# Patient Record
(demographics unavailable — no encounter records)

---
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
## 2024-10-23 NOTE — ASSESSMENT
[FreeTextEntry1] : 66F with urge predominant incontinence that has improved with consistent use of Trospium.  Microscopic Hematuria - neg w/u as of 11/15/23 - CTU images reviewed, no  pathology - s/p cysto --> neg  Glucosuria + Proteinuria - HGB A1C was 5.4  Urinary Urge + UUI - Pt to discuss w/ PCP the feasibility of taking diuretic in AM; if this doesn't drop her nighttime voided volume of <33% of daily volume, would refer to sleep study to r/o MARTIN - Voiding diary reviewed; nocturia may be 2/2 to diuretic taken at night + some degree of MARTIN - Continue to avoid alcohol and caffeinated beverages - Treat constipation; goal is to have daily soft BMs; more aggressive bowel regimen indicated in light of narcotic use - Reinforced importance of pelvic floor strengthening - Continue Trospium 20mg daily - refill sent  Vaginal dryness/atrophy - Recommend consistent use of Premarin cream as per GYN  - RTC in 6 months, sooner if issues arise   Carlos Pascal MD Chief of Urology, [address], [facility] [address]tr[address] [place] [address] Phone: [phone] Fax: [phone]

## 2024-10-23 NOTE — ASSESSMENT
[FreeTextEntry1] : 66F with urge predominant incontinence that has improved with consistent use of Trospium.  Microscopic Hematuria - neg w/u as of 11/15/23 - CTU images reviewed, no  pathology - s/p cysto --> neg  Glucosuria + Proteinuria - HGB A1C was 5.4  Urinary Urge + UUI - Pt to discuss w/ PCP the feasibility of taking diuretic in AM; if this doesn't drop her nighttime voided volume of <33% of daily volume, would refer to sleep study to r/o MARTIN - Voiding diary reviewed; nocturia may be 2/2 to diuretic taken at night + some degree of MARTIN - Continue to avoid alcohol and caffeinated beverages - Treat constipation; goal is to have daily soft BMs; more aggressive bowel regimen indicated in light of narcotic use - Reinforced importance of pelvic floor strengthening - Continue Trospium 20mg daily - refill sent  Vaginal dryness/atrophy - Recommend consistent use of Premarin cream as per GYN  - RTC in 6 months, sooner if issues arise   Carlos Pascal MD Chief of Urology, [address], [facility] [address]tr[address] [place] [address] Phone: [phone] Fax: [phone]

## 2024-10-23 NOTE — ASSESSMENT
[FreeTextEntry1] : 66F with urge predominant incontinence that has improved with consistent use of Trospium.  Microscopic Hematuria - neg w/u as of 11/15/23 - CTU images reviewed, no  pathology - s/p cysto --> neg  Glucosuria + Proteinuria - HGB A1C was 5.4  Urinary Urge + UUI - Pt to discuss w/ PCP the feasibility of taking diuretic in AM; if this doesn't drop her nighttime voided volume of <33% of daily volume, would refer to sleep study to r/o MARTIN - Voiding diary reviewed; nocturia may be 2/2 to diuretic taken at night + some degree of MARTIN - Continue to avoid alcohol and caffeinated beverages - Treat constipation; goal is to have daily soft BMs; more aggressive bowel regimen indicated in light of narcotic use - Reinforced importance of pelvic floor strengthening - Continue Trospium 20mg daily - refill sent  Vaginal dryness/atrophy - Recommend consistent use of Premarin cream as per GYN  - RTC in 6 months, sooner if issues arise   Carlos Pascal MD Chief of Urology, [address], [facility] [address]tr[address] [place] [address] Phone: [phone] Fax: [phone]

## 2024-10-30 NOTE — PHYSICAL EXAM
[None] : none [Cooperative] : cooperative [Depressed] : depressed [Clear] : clear [Linear/Goal Directed] : linear/goal directed [Average] : average [WNL] : within normal limits [FreeTextEntry1] : overweight [FreeTextEntry8] : better [de-identified] : dysphoric. better

## 2024-10-30 NOTE — PLAN
[FreeTextEntry4] : Assessment: Patient is a 64 yo female with h/o depression and anxiety seen today for medication management. Patient is compliant with the medications, tolerating it well without any side effects. I-STOP was checked without any problems.  I-STOP: Patient Demographic Information (PDI)     PDI	First Name	Last Name	Birth Date	Gender	Street Address	[facility]	Zip Code [place]	11/27/1957	Female	[address] [facility]	[id number]  Prescription Information    PDI Filter:   PDI	My Rx	Current Rx	Drug Type	Rx Written	Rx Dispensed	Drug	Quantity	Days Supply	Prescriber Name	Prescriber NICKI #	Payment Method	Dispenser A	N	Y	S	10/08/2023	10/11/2023	dextroamp-amphet er 20 mg cap	14	14	Marley Carmona	[id number]	Medicare	[id number] A	N	Y	O	10/07/2023	10/11/2023	oxycodone-acetaminophen 7.5-325 mg tablet	90	30	Edvin Dinero)	[id number]	Medicare	[facility][id number] A	N	N	O	09/05/2023	09/08/2023	oxycodone-acetaminophen 7.5-325 mg tablet	90	30	Edvin Dinero)	[id number]	Medicare	[id number] A	N	N	O	07/28/2023	08/01/2023	oxycodone-acetaminophen 7.5-325 mg tablet	90	30	Edvin Dinero)	[id number]	Medicare	[id number] A	N	N	O	06/15/2023	06/25/2023	oxycodone hcl (ir) 10 mg tab	90	30	Edvin Dinero)	[id number]	Medicare	[facility][id number] A	N	N	O	05/11/2023	05/22/2023	oxycodone hcl (ir) 10 mg tab	90	30	Edvin Dinero)	[id number]	Medicare	[id number] A	N	N	B	03/17/2023	04/08/2023	clonazepam 1 mg tablet	54	28	Derik Vilchis MD	[id number]	Medicare	[id number] A	N	N	O	04/03/2023	04/08/2023	oxycodone hcl (ir) 10 mg tab	90	30	Edvin Dinero)	[id number]	Medicare	[id number] A	N	N	O	02/28/2023	03/06/2023	oxycodone hcl (ir) 10 mg tab	90	30	Edvin Dinero (MD)	[id number]	Medicare	[id number] A	N	N	B	02/13/2023	03/05/2023	clonazepam 1 mg tablet	54	27	Derik Vilchis MD	[id number]	Medicare	[id number] A	N	N	S	02/13/2023	02/25/2023	dextroamp-amphetamin 20 mg tab	30	30	SodaroDerik MD	[id number]	Medicare	[id number] A	N	N	O	01/27/2023	02/03/2023	oxycodone hcl (ir) 10 mg tab	90	30	Edvin Dinero)	[id number]	Medicare	[id number] A	N	N	B	01/13/2023	01/30/2023	clonazepam 1 mg tablet	56	28	SodDerik mera MD	[id number]	Medicare	[id number] A	N	N	S	01/13/2023	01/24/2023	dextroamp-amphetamin 20 mg tab	30	30	SodDerik mera MD	[id number]	Medicare	[id number] A	N	N	O	12/23/2022	01/03/2023	oxycodone hcl (ir) 10 mg tab	90	30	Edvin Dinero)	[id number]	Medicare	[facility][id number] A	N	N	S	12/13/2022	12/28/2022	dextroamp-amphetamin 20 mg tab	15	30	Derik Vilchis MD	[id number]	[facility][id number] A	N	N	B	12/13/2022	12/28/2022	clonazepam 1 mg tablet	58	30	Derik Vilchis MD	[id number]	[facility] #[id number] A	N	N	O	11/10/2022	12/01/2022	oxycodone hcl (ir) 10 mg tab	90	30	Edvin Dinero)	[id number]	[facility] #[id number] A	N	N	S	11/15/2022	11/28/2022	dextroamp-amphetamin 20 mg tab	15	30	Derik Vilchis MD	[id number]	Insurance	[id number] A	N	N	B	11/15/2022	11/28/2022	clonazepam 1 mg tablet	60	30	Derik Vilchis MD	[id number]	Insurance	[facility] #[id number]   PLAN: On Oxycodone.  Continue Cymbalta 120 mg PO QAM for depression and anxiety. Continue Wellbutrin  mg PO QAM for depression, low motivation, energy, concentration and decrease SSRI induced sexual dysfunction. Continue Adderall 25 mg PO QD for ADHD.   Continue Trazodone 50 mg PO QHS for insomnia. Gave two 50 mg  Wean off Pristiq D/C Klonopin and Xanax. Has old scripts.  - Discussed risks and benefits of medications including side effects of GI and sexual with SSRI. Alternative strategies including no intervention discussed with patient. Patient consents to current medications as prescribed. - Discussed with patient regarding importance of abstinence and sobriety from alcohol and drugs. Educated about relationship between worsening mood/anxiety symptoms and drug use and improvement of symptoms with abstinence.  - Discussed about unpredictable effects including cardiorespiratory collapse from the combination of illicit drugs and prescribed medications. Patient verbalized understanding. - Patient understands to contact clinic prn with concerns and agrees to call 911 or go to nearest ER if symptoms worsen. - Next appointment made in 3 month. Patient was not in any distress.
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
## 2024-11-01 NOTE — HISTORY OF PRESENT ILLNESS
[No Pertinent Cardiac History] : no history of aortic stenosis, atrial fibrillation, coronary artery disease, recent myocardial infarction, or implantable device/pacemaker [Asthma] : asthma [COPD] : COPD [No Adverse Anesthesia Reaction] : no adverse anesthesia reaction in self or family member [(Patient denies any chest pain, claudication, dyspnea on exertion, orthopnea, palpitations or syncope)] : Patient denies any chest pain, claudication, dyspnea on exertion, orthopnea, palpitations or syncope [Moderate (4-6 METs)] : Moderate (4-6 METs) [Chronic Anticoagulation] : no chronic anticoagulation [Chronic Kidney Disease] : no chronic kidney disease [Diabetes] : no diabetes [FreeTextEntry1] : Knee replacement [FreeTextEntry2] : 11/7/2024 [FreeTextEntry3] : Dr. Washburn  [FreeTextEntry4] : 67yo female with history of mild intermittent asthma, hyperlipidemia, hypothyroid, depression, hypertension, osteopenia presents for medical clearance for right knee replacement.

## 2024-11-01 NOTE — ASSESSMENT
[High Risk Surgery - Intraperitoneal, Intrathoracic or Supringuinal Vascular Procedures] : High Risk Surgery - Intraperitoneal, Intrathoracic or Supringuinal Vascular Procedures - No (0) [Ischemic Heart Disease] : Ischemic Heart Disease - No (0) [Congestive Heart Failure] : Congestive Heart Failure - No (0) [Prior Cerebrovascular Accident or TIA] : Prior Cerebrovascular Accident or TIA - No (0) [Creatinine >= 2mg/dL (1 Point)] : Creatinine >= 2mg/dL - No (0) [Insulin-dependent Diabetic (1 Point)] : Insulin-dependent Diabetic - No (0) [0] : 0 , RCRI Class: I, Risk of Post-Op Cardiac Complications: 3.9%, 95% CI for Risk Estimate: 2.8% - 5.4% [Patient Optimized for Surgery] : Patient optimized for surgery [No Further Testing Recommended] : no further testing recommended [As per surgery] : as per surgery [FreeTextEntry4] : 67yo female with history of mild intermittent asthma, hyperlipidemia, hypothyroid, depression, hypertension, osteopenia presents for medical clearance for right knee replacement. Instructions for duoneb use reviewed, symbicort.

## 2024-11-25 NOTE — ASSESSMENT
[FreeTextEntry1] : The patient comes in today for follow-up on her right knee.  She is now 2 weeks out from her right total knee replacement for posttraumatic arthritis.  Overall she is doing beautifully.  Her wounds are healed.  There is no signs of infection or DVT.  Range of motion 0 to 100 degrees with normal tracking, no crepitation.  Examination of the right knee reveals well-healed scar with no signs of infection or DVT.  Her staples removed uneventfully and Steri-Strips were applied.  Range of motion 0 to 100 degrees with normal tracking no crepitation.  There is no instability.  Plan at this time is physical therapy.  Will see her back in the office in 2 or 3 weeks with x-rays 4 views of the right knee.

## 2024-11-25 NOTE — HISTORY OF PRESENT ILLNESS
[FreeTextEntry5] : 65 y/o F presents for PO #1 eval today. Pt reports of continued pain with recovery.

## 2024-12-08 NOTE — HISTORY OF PRESENT ILLNESS
[] : yes [FreeTextEntry5] : 68 y/o F presents for PO #2 eval today. Pt reports of increased pain since last visit.

## 2024-12-08 NOTE — ASSESSMENT
[FreeTextEntry1] : The patient is 4 weeks s/p a right knee TKA on 11/7/24. The patient denies fever, chills, CP, SOB. The patient denies drainage or discharge from their incision. The patient is doing well postoperatively. The patient is using oxycodone every 4 hours or so with good relief. She is using a walker and will begin outpatient PT. She is making good functional progress. She denies paresthesias. She denies new trauma.   Right knee exam: .Neurovascularly intact. Sensation intact. Examination of the right knee reveals well-healed scar with no signs of infection or DVT. Steri strips removed with two small blisters to the proximal incision. No active drainage or discharge. Range of motion 0 to 110 degrees with normal tracking no crepitation.  There is no instability.  Right knee xrays taken today in office, 4 views NWB - Revision right TKA hardware intact without movement or loosening. No fractures or loose bodies. No obvious tumors, masses, or lesions.  The patient is doing well overall. She will begin outpatient PT and use oxycodone 10mg as needed. She will return in 8 weeks time. She will wean off of walker as comfortable.

## 2024-12-08 NOTE — HISTORY OF PRESENT ILLNESS
[] : yes [FreeTextEntry5] : 66 y/o F presents for PO #2 eval today. Pt reports of increased pain since last visit.

## 2025-01-31 NOTE — HISTORY OF PRESENT ILLNESS
[] : yes [FreeTextEntry5] : 68 y/o F presents for PO #3 eval today. Pain has improved since last visit. Reports of soreness at times, especially after PT. PT 2x a week. WB with cane.

## 2025-01-31 NOTE — ASSESSMENT
[FreeTextEntry1] : The patient is 11 weeks s/p a right knee TKA on 11/7/24.  The patient is doing well postoperatively. The patient has significantly decreased her oxycodone and is taking one in the morning and one at night. She denies new trauma. She denies paresthesias. She is making good functional progress. She is continuing with PT and is doing well.  Right knee exam: Neurovascularly intact. Sensation intact. Examination of the right knee reveals well-healed scar with no signs of infection or DVT. No active drainage or discharge. Range of motion 0 to 115 degrees with normal tracking no crepitation.  There is no instability.  The plan at this time is to continue physical therapy.  I will see her back in the office in 3 months as needed.

## 2025-02-05 NOTE — PROCEDURE
[FreeTextEntry1] : 67y/o female never smoker BMI 28.72  PFT 1/4/2024 Poor efforts noted on expiratory limb FVC FEV1 and FEV1/FVC are normal FEV1.FVC is normal corrected for age FEF 25-75% is normal MVV is normal  Lung volumes by body plethysmography reveal high TLC and RV/TLC  SILVANO) is reduced and DLCo/Va is normal  Impression Likely normal Spirometry. Airtrapping. recommend clincial correlation  Signatures     Electronically signed by : CITLALY ESCAMILLA MD; Jan 5 2024  2:33PM Eastern Standard Time (Author)  12/2023 ACC: [id number] EXAM: CT CHEST ORDERED BY: CITLALY ESCAMILLA  PROCEDURE DATE: 12/01/2023 INTERPRETATION: Clinical information: Cough. Exam is compared to previous study of 10/13/2023. CT scan of the chest was obtained without administration of intravenous contrast. No hilar or mediastinal adenopathy is noted. Heart is normal in size. No pericardial effusion is noted. Calcification of the coronary arteries noted. Moderate to large hiatal hernia is noted. No endobronchial lesions are noted. Mild bronchiectasis is noted in the right middle lobe and lingula. Few ill-defined opacities are noted in the right middle lobe and lingula. A few tree-in-bud opacities are scattered within both lungs. Previously noted patchy groundglass opacities in the left upper lobe are no longer present. No pleural effusions are noted.  Below the diaphragm, visualized portions of the abdomen are unremarkable.  Degenerative changes of the spine are noted.  IMPRESSION: Previously noted patchy groundglass opacities in the left upper lobe are no longer present and have resolved.  Mild bronchiectasis in the right middle lobe/lingula, few ill-defined opacities in the right middle lobe and lingula as well as tree-in-bud opacities are scattered within both lungs. Constellation of the above findings suggest probable Mycobacterium avium complex infection. --- End of Report --  ANASTASIA CERDA MD; Attending Radiologist This document has been electronically signed. Dec 12 2023 8:44AMmmigdalia gutierrez

## 2025-02-05 NOTE — CURRENT MEDS
[Takes medication as prescribed] : does not take [FreeTextEntry1] : does not like to take  her medications as discussed -

## 2025-02-05 NOTE — PHYSICAL EXAM
[IV] : Mallampati Class: IV [Normal Appearance] : normal appearance [Supple] : supple [No Neck Mass] : no neck mass [No JVD] : no jvd [Normal Rate/Rhythm] : normal rate/rhythm [Normal S1, S2] : normal s1, s2 [No Murmurs] : no murmurs [No Resp Distress] : no resp distress [No Acc Muscle Use] : no acc muscle use [Kyphosis] : kyphosis [Benign] : benign [Not Tender] : not tender [No Hernias] : no hernias [Normal Gait] : normal gait [No Clubbing] : no clubbing [No Edema] : no edema [No Rash] : no rash [No Motor Deficits] : no motor deficits [Normal Affect] : normal affect [TextBox_2] : pleasant f no distress     no cough  [TextBox_11] : crowded airway no lesion moist [TextBox_68] : bilateral   wheezing

## 2025-02-05 NOTE — REVIEW OF SYSTEMS
[Dry Eyes] : dry eyes [Sore Throat] : sore throat [Dry Mouth] : dry mouth [Back Pain] : back pain [Chronic Pain] : chronic pain [Thyroid Problem] : thyroid problem [Obesity] : obesity [Cough] : cough [Fever] : no fever [Recent Wt Gain (___ Lbs)] : ~T no recent weight gain [Chills] : no chills [Recent Wt Loss (___ Lbs)] : ~T no recent weight loss [Nasal Congestion] : no nasal congestion [Sinus Problems] : no sinus problems [Hemoptysis] : no hemoptysis [Sputum] : no sputum [Dyspnea] : no dyspnea [Wheezing] : no wheezing [SOB on Exertion] : no sob on exertion [Chest Discomfort] : no chest discomfort [Palpitations] : no palpitations [GERD] : no gerd [Abdominal Pain] : no abdominal pain [Nausea] : no nausea [Vomiting] : no vomiting [Dysphagia] : no dysphagia [Bleeding] : no bleeding [Rash] : no rash [Blood Transfusion] : no blood transfusion [Clotting Disorder/ Frequent bleeding] : no clotting disorder/ frequent bleeding [Diabetes] : no diabetes [TextBox_14] : lasik  [TextBox_94] : knee   fracture    oct   2024  surgery

## 2025-02-05 NOTE — ASSESSMENT
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  6- vaccinations   Recommendations 1-  on prilsec  and  famotidine  2- trial of trelegy   100 qd rinse after use 3-  prednisone   taper   4- sputum  culture   afb    blood work   RVP  5-    ct f/u three months 6-  zpak  7- states she does not make sputum  --   told to try and explained  process  - discussion on ct and  asthma  - risk of progression of disease - risk of cardiac and  resp complications reviewed - discussed to take her medications and follow up  as  discussed - this visit    required several phone calls and reminders      - she does not  do  above as recommended - to the ED   if worse

## 2025-02-05 NOTE — HISTORY OF PRESENT ILLNESS
[Former] : former [< 20 pack-years] : < 20 pack-years [Never] : never [TextBox_4] : 11/7/2023 64y/o    female born in [place]  ex smoker ( only 4 year quti 1981)  retired    RN   h/o  ADHA     has dog  HTN  h/o right leg tibial  practure and  repair  levon - then -  restless leg syndrome   hypothyroid     here for abn ct - h/o hiatal hernia  8 years   GI  Dr Blood -   endoscopy   multiple    ---  -covid   --   over one year ago  -    Nov2022   mild   cough fever  aches joint  no hospital  -  seen by rheum in past  for pain     bilateral   hip replacement   - negative    work up    2 yeears  -  had cold ---       when ct chest done  ---    and wheezing      chest congestion  -  bronchial cough     - gerd  omeprazole  20 mg po qd now ( was on 40 mg po qd)   -  CT reviewed with patient in detail    was sick  when   done  --  10/2023 AIRWAYS/LUNGS/PLEURA: Central airways are patent. Secretions in the distal segmental branches (best visualized in the right upper lobe read). Bilateral groundglass opacities, which are most prominent in the left upper lobe. Bilateral, peripheral and lower lobe predominant tree-in-bud nodules. Linear opacity in the medial right middle lobe and lingula, likely representing atelectasis. 4 mm nodule along the minor fissure (2-88). 3 mm nodule in the left upper lobe (2-215). No pleural effusion.  UPPER ABDOMEN: Hiatal hernia is unchanged.  BONES/SOFT TISSUES: T12 hemangioma. Degenerative changes.  IMPRESSION:  1. Bilateral peripheral lower lobe predominant tree-in-bud opacities. Additional bilateral groundglass opacities, which are most prominent in the left upper lobe. This may be secondary to infectious process such as atypical mycobacteria. However, other etiologies are not excluded. 2. Bilateral pulmonary nodules measuring up to 4 mm.    --- End of Report ---     AMEYA WILDER MD; Resident Radiologist This document has been electronically signed. CHAYA CERDA MD; Attending Radiologist This document has been electronically signed. 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  asthma   [facility]  abn ct   pre-operative    respiratory  exam  total knee replacement  right after  fracture  -asthma   stable no cough  no need symbicort   prn only  -rheum  n egative   work up  - will see  heme - seen by GI Dr Jones  upper GI -ectopic gastric mucosa upper 1/3 esophagus - abn esophageal motility bx -3 cm hiatal hernia - gastric polyp resected - gastric body- erythematous mucosa bx  -will get esophageal manometry-    did get  some   motility  issue  lax in movement -- only PPI  for   medications   - had knee left fracture -     walking  with brace    2/5/2025    lost to follow up  66y/o female ex smoker   retired RN  asthma   bronchiectasis   seen by Dr Jones   hiatal hernia  GERD abn motility    f/u  ct chest - no recent  infection  - cough intermittent  weeks   - no symbicort use now - feels  cough improved now   mild now - she does not feel  she has asthma nor thinks  she needs inhalers - reviewed ct in detail and   recent uri with  cough now  clear mucous   - s/p shoulder surgery   1/2024 - right knee surgery   11/2024 - ct reviewed - currently  feels good -  [TextBox_13] : 4 [YearQuit] : 1981

## 2025-03-19 NOTE — PHYSICAL EXAM
[None] : none [Cooperative] : cooperative [Depressed] : depressed [Clear] : clear [Linear/Goal Directed] : linear/goal directed [Average] : average [WNL] : within normal limits [FreeTextEntry1] : overweight [FreeTextEntry8] : better [de-identified] : dysphoric. better

## 2025-03-19 NOTE — PLAN
[FreeTextEntry4] : Assessment: Patient is a 66 yo female with h/o depression and anxiety seen today for medication management. Patient is compliant with the medications, tolerating it well without any side effects. I-STOP was checked without any problems.  I-STOP: Patient Demographic Information (PDI)     PDI	First Name	Last Name	Birth Date	Gender	Street Address	[facility]	Zip Code [place]	11/27/1957	Female	[address] at the [facility]	[id number]  Prescription Information    PDI Filter:   PDI	My Rx	Current Rx	Drug Type	Rx Written	Rx Dispensed	Drug	Quantity	Days Supply	Prescriber Name	Prescriber NICKI #	Payment Method	Dispenser A	N	Y	S	10/08/2023	10/11/2023	dextroamp-amphet er 20 mg cap	14	14	Marley Carmona	[id number]	Medicare	[id number] A	N	Y	O	10/07/2023	10/11/2023	oxycodone-acetaminophen 7.5-325 mg tablet	90	30	Edvin Dinero)	[id number]	Medicare	[id number] A	N	N	O	09/05/2023	09/08/2023	oxycodone-acetaminophen 7.5-325 mg tablet	90	30	Edvin Dinero)	[id number]	Medicare	[id number] A	N	N	O	07/28/2023	08/01/2023	oxycodone-acetaminophen 7.5-325 mg tablet	90	30	Edvin Dinero)	[id number]	Medicare	[id number] A	N	N	O	06/15/2023	06/25/2023	oxycodone hcl (ir) 10 mg tab	90	30	Edvin Dinero)	[id number]	Medicare	[id number] A	N	N	O	05/11/2023	05/22/2023	oxycodone hcl (ir) 10 mg tab	90	30	Edvin Dinero)	[id number]	Medicare	[id number] A	N	N	B	03/17/2023	04/08/2023	clonazepam 1 mg tablet	54	28	Derik Vilchis MD	[id number]	Medicare	[id number] A	N	N	O	04/03/2023	04/08/2023	oxycodone hcl (ir) 10 mg tab	90	30	Edvin Dinero)	[id number]	Medicare	[id number] A	N	N	O	02/28/2023	03/06/2023	oxycodone hcl (ir) 10 mg tab	90	30	Edvin Dinero (MD)	[id number]	Medicare	[id number] A	N	N	B	02/13/2023	03/05/2023	clonazepam 1 mg tablet	54	27	Derik Vilchis MD	[id number]	Medicare	[id number] A	N	N	S	02/13/2023	02/25/2023	dextroamp-amphetamin 20 mg tab	30	30	SodaroDerik MD	[id number]	Medicare	[id number] A	N	N	O	01/27/2023	02/03/2023	oxycodone hcl (ir) 10 mg tab	90	30	Edvin Dinero)	[id number]	Medicare	[id number] A	N	N	B	01/13/2023	01/30/2023	clonazepam 1 mg tablet	56	28	SodDerik mera MD	[id number]	Medicare	[id number] A	N	N	S	01/13/2023	01/24/2023	dextroamp-amphetamin 20 mg tab	30	30	SodDerik mera MD	[id number]	Medicare	[id number] A	N	N	O	12/23/2022	01/03/2023	oxycodone hcl (ir) 10 mg tab	90	30	Edvin Dinero)	[id number]	Medicare	[id number] A	N	N	S	12/13/2022	12/28/2022	dextroamp-amphetamin 20 mg tab	15	30	Derik Vilchis MD	[id number]	[id number] A	N	N	B	12/13/2022	12/28/2022	clonazepam 1 mg tablet	58	30	Derik Vilchis MD	[id number]	[id number] A	N	N	O	11/10/2022	12/01/2022	oxycodone hcl (ir) 10 mg tab	90	30	Edvin Dinero)	[id number]	[id number] A	N	N	S	11/15/2022	11/28/2022	dextroamp-amphetamin 20 mg tab	15	30	Derik Vilchis MD	[id number]	Insurance	[id number] A	N	N	B	11/15/2022	11/28/2022	clonazepam 1 mg tablet	60	30	Derik Vilchis MD	[id number]	Insurance	[id number]   PLAN: On Oxycodone. Dose decreased from 30 to 15 mg.  Continue Cymbalta 120 mg PO QAM for depression and anxiety. Continue Wellbutrin  mg PO QAM for depression, low motivation, energy, concentration and decrease SSRI induced sexual dysfunction. Continue Adderall 25 mg PO QD for ADHD.   Continue Trazodone 50 mg PO QHS for insomnia. Gave Three 50 mg  Wean off Pristiq D/C Klonopin and Xanax. Has old scripts.  - Discussed risks and benefits of medications including side effects of GI and sexual with SSRI. Alternative strategies including no intervention discussed with patient. Patient consents to current medications as prescribed. - Discussed with patient regarding importance of abstinence and sobriety from alcohol and drugs. Educated about relationship between worsening mood/anxiety symptoms and drug use and improvement of symptoms with abstinence.  - Discussed about unpredictable effects including cardiorespiratory collapse from the combination of illicit drugs and prescribed medications. Patient verbalized understanding. - Patient understands to contact clinic prn with concerns and agrees to call 911 or go to nearest ER if symptoms worsen. - Next appointment made in 3 month. Patient was not in any distress.

## 2025-03-19 NOTE — HISTORY OF PRESENT ILLNESS
[FreeTextEntry1] : The following service was provided using telehealth between writer/provider and patient. The patient was at home. The writer was at clinic. Patient was alone and consented to telehealth format. All treatment plans through and including today's date were reviewed with the patient.  Patient is here for 5-month follow-up visit via telehealth.  No medication changes at that time. States she si depressed as she didn't have enough time to grieve for her brother and father. +Anheodnia. Speakign top ehr therapist.   Mood: anxious and depressed.  Sleepin hours broken. Goes to sleep by 9:30 and wakes up at 1:30 am. Hard to go to sleep.  Appetite: is good.  Energy concentration and motivation are all decreased as she was able to obtain the Adderall. On Oxycodone. Doing yoga classes and crocheting classes.  Started joining the dance class and card game.  Denies any AVH, SI or HI.  Last drink: "I dont remember."

## 2025-04-23 NOTE — ASSESSMENT
[FreeTextEntry1] : 67F with urge predominant incontinence that has improved with consistent use of Trospium.  Microscopic Hematuria - neg w/u as of 11/15/23 - CTU images reviewed, no  pathology - s/p cysto --> neg  Glucosuria + Proteinuria - HGB A1C was 5.4  Urinary Urge + UUI - Pt to discuss w/ PCP the feasibility of taking diuretic in AM; if this doesn't drop her nighttime voided volume of <33% of daily volume, would refer to sleep study to r/o MARTIN - Voiding diary reviewed; nocturia may be 2/2 to diuretic taken at night + some degree of MARTIN - Continue to avoid alcohol and caffeinated beverages - Treat constipation; goal is to have daily soft BMs; more aggressive bowel regimen indicated in light of narcotic use - Reinforced importance of pelvic floor strengthening - Transition to trospium 60mg ER daily -Rx sent  Vaginal dryness/atrophy - Recommend consistent use of Premarin cream as per GYN  - RTC in 6 months, sooner if issues arise   Carlos Pascal MD Chief of Urology, [address], [address] [place] [address] Phone: [phone] Fax: [phone]

## 2025-04-23 NOTE — HISTORY OF PRESENT ILLNESS
[FreeTextEntry1] : Chief Complaint: urine incontinence Date of first visit: 09/11/2023  KATRIN CRAMER is a 67-year-old  woman with a PMHx of HTN, HLD, and depression who presents for evaluation of urge incontinence. Pt states that this has become an issue over the past 6 months. Around that time, she had an accident that resulted in her needing her R tibia repaired. As a result, she is unable to move as quickly as she was previously and she has constant pain, for which she is taking oxycodone. Regarding her urinary sx, she denies much stress incontinence, and does not find this bothersome. She reports sudden urges to void followed by large volume leakage. She denies frequency, dysuria, or gross hematuria. She does have hx of multiple UTIs/yr, but feels this is behavioral related as they happen if she stays in a wet bathing suit for an extended period of time. She has been started on Premarin cream by her GYN. She states that she has been struggling with constipation and she does not have daily bowel movements. She takes a Dulcolax weekly. Denies routine caffeine use but has alcohol a few times per week. She notes that her sx are worse with alcohol use. Does not eat much spicy food or chocolate.  As of 11/15/23, pt reports that she still has some urgency and urge incontinence. Denies any sx of stress incontinence, no leakage when cough, sneezes or valsalvas. At times, will have an incontinence episode when she stands up, but feels no urge. Discussed results of UA that show proteinuria as well as significant glucosuria. Has not had recent HgA1c. Ucx showed low CFU of enterococcus. Pt is asymptomatic. Was not treated, and pt here for cathed specimen. We went over images from CTU, which showed no evidence of  pathology. Voiding diary showed that she make ~47% of daily urine volume at night, but she does state that she takes her diuretic in the evening. As of 12/14/23 she has noted a vaginal yeat infection after starting symbicort.  1/22/23 Has has left clavicle fracture s/p ORIF since last appointment. Is recovering well. Has been taking trospium not every day but when she knows she will require herself to remain dry. This is due to the constipation side effect. At baseline she suffers from constipation. The medication has greatly improved her quality of life. She continues to have nocturia and takes her diuretic at night.  As of 4/22/2024, patient that she is doing very well and has had no episodes of severe urgency urge incontinence. She switched back to Trospium from Myrbetriq as she felt like the Trospium worked better for her. She states that due to some dry mouth, she has only been taking the medications daily versus every other day. She is currently very happy with her voiding symptoms.  10/23/24: Patient presents today for follow up. She admits to not taking her Trospium consistently, however on the days she is consistent, she notices an improvement in her UUI symptoms. Overall, she reports she is happy with her current voiding symptoms.  04/23/25: Patient has noticed that her daytime frequency remains improved, though she is developed significant evening urgency/frequency and nocturia.  She currently is only taking 20 mg of the immediate release trospium.  PVR today equals 0 cc   PVR 04/22/24 = 64cc 01/22/24 = 20cc  CT Hx: - CTU (10/06/23): LOWER CHEST: Moderate hiatal hernia. Scattered tree-in-bud nodules. Scattered ground glass nodules; ADRENALS: Within normal limits; KIDNEYS/URETERS: Within normal limits. No urothelial lesion; BLADDER: Within normal limits; REPRODUCTIVE ORGANS: A 6.5 cm left adnexal cyst again noted  PMHx: HTN, HLD, hypothyroidism, peptic ulcer dz, depression SxHx: multiple ortho sx (R tibial, b/l hip, b/l shoulder, R ankle, R knee FHx: colon cancer, breast cancer, liver cancer (all in sister) SocHx: social aochol use, former smoker (quit 1981) Allergies: NKDA  The patient denies fevers, chills, nausea and or vomiting and no unexplained weight loss. All pertinent laboratory, films and physician notes were reviewed.

## 2025-04-23 NOTE — PHYSICAL EXAM
[General Appearance - Well Developed] : well developed [General Appearance - Well Nourished] : well nourished [Normal Appearance] : normal appearance [Well Groomed] : well groomed [General Appearance - In No Acute Distress] : no acute distress [Edema] : no peripheral edema [Respiration, Rhythm And Depth] : normal respiratory rhythm and effort [Exaggerated Use Of Accessory Muscles For Inspiration] : no accessory muscle use [Abdomen Soft] : soft [Abdomen Tenderness] : non-tender [Abdomen Mass (___ Cm)] : no abdominal mass palpated [Urinary Bladder Findings] : the bladder was normal on palpation [Skin Color & Pigmentation] : normal skin color and pigmentation [Skin Turgor] : supple [] : no rash [No Focal Deficits] : no focal deficits [Oriented To Time, Place, And Person] : oriented to person, place, and time [Affect] : the affect was normal [Mood] : the mood was normal [Not Anxious] : not anxious [de-identified] : Walking with cane

## 2025-05-29 NOTE — ASSESSMENT
[FreeTextEntry1] :   Blood work requisition provided. Will follow up with results.  I am providing continuous care for this patient that includes testing, discussion of options for treatment, and shared decision making with regard to the chosen treatment.

## 2025-05-29 NOTE — HISTORY OF PRESENT ILLNESS
[FreeTextEntry8] : 68yo female with history of mild intermittent asthma, hyperlipidemia, hypothyroid, depression, hypertension, osteopenia presents for acute on chronic constipation, medication refills, and request to switch from armour thyroid to levothyroxine due to cost. The primary concern today is opioid-induced constipation, with the patient reporting no bowel movement for 11 days. This is becoming an increasingly significant issue. The patient is currently taking oxycodone 7.5 mg TID for pain management. Typically, the patient has a bowel movement once a week. The patient has tried various over-the-counter remedies including Senna (X-Lax), MagCitrate, Colace, and Miralax all with limited success. The patient reports feeling uncomfortable due to the constipation.   Review of Systems:     - General: Reports weight gain of 4-5 pounds due to constipation     - Neurological: No significant issues mentioned     - Musculoskeletal: Good movement in knee, increased hip pain     - Cardiovascular: Hypertension noted     - Respiratory: No issues mentioned     - Gastrointestinal: Severe constipation, GERD     - Genitourinary: No issues mentioned     - Integumentary: No issues mentioned     - Psychiatric: Under care of psychiatrist  Medications:     - Oxycodone 7.5 mg TID     - Bupropion 300 mg and 150 mg     - statin      - Hydrochlorothiazide (as needed for leg swelling)     - Trazodone (dosage not specified)     - Duloxetine      - [place] Thyroid 60 mg

## 2025-05-29 NOTE — HISTORY OF PRESENT ILLNESS
[FreeTextEntry8] : 68yo female with history of mild intermittent asthma, hyperlipidemia, hypothyroid, depression, hypertension, osteopenia presents for acute on chronic constipation, medication refills, and request to switch from armour thyroid to levothyroxine due to cost. The primary concern today is opioid-induced constipation, with the patient reporting no bowel movement for 11 days. This is becoming an increasingly significant issue. The patient is currently taking oxycodone 7.5 mg TID for pain management. Typically, the patient has a bowel movement once a week. The patient has tried various over-the-counter remedies including Senna (X-Lax), MagCitrate, Colace, and Miralax all with limited success. The patient reports feeling uncomfortable due to the constipation.   Review of Systems:     - General: Reports weight gain of 4-5 pounds due to constipation     - Neurological: No significant issues mentioned     - Musculoskeletal: Good movement in knee, increased hip pain     - Cardiovascular: Hypertension noted     - Respiratory: No issues mentioned     - Gastrointestinal: Severe constipation, GERD     - Genitourinary: No issues mentioned     - Integumentary: No issues mentioned     - Psychiatric: Under care of psychiatrist  Medications:     - Oxycodone 7.5 mg TID     - Bupropion 300 mg and 150 mg     - statin      - Hydrochlorothiazide (as needed for leg swelling)     - Trazodone (dosage not specified)     - Duloxetine      - [place] Thyroid 60 mg

## 2025-06-10 NOTE — PLAN
[FreeTextEntry4] : Assessment: Patient is a 66 yo female with h/o depression and anxiety seen today for medication management. Patient is compliant with the medications, tolerating it well without any side effects. I-STOP was checked without any problems.  I-STOP: Patient Demographic Information (PDI)     PDI	First Name	Last Name	Birth Date	Gender	Street Address	[facility]	Zip Code [place]	11/27/1957	Female	[address] [facility]	[address]  Prescription Information    PDI Filter:   PDI	My Rx	Current Rx	Drug Type	Rx Written	Rx Dispensed	Drug	Quantity	Days Supply	Prescriber Name	Prescriber NICKI #	Payment Method	Dispenser A	N	Y	S	10/08/2023	10/11/2023	dextroamp-amphet er 20 mg cap	14	14	Marley Carmona	[id number]	Medicare	[id number] A	N	Y	O	10/07/2023	10/11/2023	oxycodone-acetaminophen 7.5-325 mg tablet	90	30	Edvin Dinero)	[id number]	Medicare	[id number] A	N	N	O	09/05/2023	09/08/2023	oxycodone-acetaminophen 7.5-325 mg tablet	90	30	Edvin Dinero)	[id number]	Medicare	[id number] A	N	N	O	07/28/2023	08/01/2023	oxycodone-acetaminophen 7.5-325 mg tablet	90	30	Edvin Dinero)	[id number]	Medicare	[id number] A	N	N	O	06/15/2023	06/25/2023	oxycodone hcl (ir) 10 mg tab	90	30	Edvin Dinero)	[id number]	Medicare	[id number] A	N	N	O	05/11/2023	05/22/2023	oxycodone hcl (ir) 10 mg tab	90	30	Edvin Dinero)	[id number]	Medicare	[id number] A	N	N	B	03/17/2023	04/08/2023	clonazepam 1 mg tablet	54	28	Derik Vilchis MD	[id number]	Medicare	[id number] A	N	N	O	04/03/2023	04/08/2023	oxycodone hcl (ir) 10 mg tab	90	30	Edvin Dinero)	[id number]	Medicare	[id number] A	N	N	O	02/28/2023	03/06/2023	oxycodone hcl (ir) 10 mg tab	90	30	Edvin Dinero (MD)	[id number]	Medicare	[id number] A	N	N	B	02/13/2023	03/05/2023	clonazepam 1 mg tablet	54	27	Derik Vilchis MD	[id number]	Medicare	[id number] A	N	N	S	02/13/2023	02/25/2023	dextroamp-amphetamin 20 mg tab	30	30	SodaroDerik MD	[id number]	Medicare	[id number] A	N	N	O	01/27/2023	02/03/2023	oxycodone hcl (ir) 10 mg tab	90	30	Edvin Dinero)	[id number]	Medicare	[id number] A	N	N	B	01/13/2023	01/30/2023	clonazepam 1 mg tablet	56	28	SodDerik mera MD	[id number]	Medicare	[id number] A	N	N	S	01/13/2023	01/24/2023	dextroamp-amphetamin 20 mg tab	30	30	SodDerik mera MD	[id number]	Medicare	[id number] A	N	N	O	12/23/2022	01/03/2023	oxycodone hcl (ir) 10 mg tab	90	30	Edvin Dinero)	[id number]	Medicare	[id number] A	N	N	S	12/13/2022	12/28/2022	dextroamp-amphetamin 20 mg tab	15	30	Derik Vilchis MD	[id number]	[id number] A	N	N	B	12/13/2022	12/28/2022	clonazepam 1 mg tablet	58	30	Derik Vilchis MD	[id number]	[facility][id number] A	N	N	O	11/10/2022	12/01/2022	oxycodone hcl (ir) 10 mg tab	90	30	Edvin Dinero)	[id number]	[facility][id number] A	N	N	S	11/15/2022	11/28/2022	dextroamp-amphetamin 20 mg tab	15	30	Derik Vilchis MD	[id number]	Insurance	[id number] A	N	N	B	11/15/2022	11/28/2022	clonazepam 1 mg tablet	60	30	Derik Vilchis MD	[id number]	Insurance	[facility][id number]   PLAN: On Oxycodone. Dose decreased from 30 to 15 mg.  Continue Cymbalta 120 mg PO QAM for depression and anxiety. Continue Wellbutrin  mg PO QAM for depression, low motivation, energy, concentration and decrease SSRI induced sexual dysfunction. Continue Adderall 25 mg PO QD for ADHD.   Continue Trazodone 50 mg PO QHS for insomnia. Gave Three 50 mg  Wean off Pristiq D/C Klonopin and Xanax. Has old scripts.  - Discussed risks and benefits of medications including side effects of GI and sexual with SSRI. Alternative strategies including no intervention discussed with patient. Patient consents to current medications as prescribed. - Discussed with patient regarding importance of abstinence and sobriety from alcohol and drugs. Educated about relationship between worsening mood/anxiety symptoms and drug use and improvement of symptoms with abstinence.  - Discussed about unpredictable effects including cardiorespiratory collapse from the combination of illicit drugs and prescribed medications. Patient verbalized understanding. - Patient understands to contact clinic prn with concerns and agrees to call 911 or go to nearest ER if symptoms worsen. - Next appointment made in 3 month. Patient was not in any distress.

## 2025-06-10 NOTE — PHYSICAL EXAM
[None] : none [Cooperative] : cooperative [Depressed] : depressed [Clear] : clear [Linear/Goal Directed] : linear/goal directed [Average] : average [WNL] : within normal limits [FreeTextEntry1] : overweight [FreeTextEntry8] : better [de-identified] : dysphoric. better

## 2025-06-10 NOTE — REASON FOR VISIT
[Patient preference] : as per patient preference [Telehealth (audio & video) - Individual/Group] : This visit was provided via telehealth using real-time 2-way audio visual technology. [Medical Office: ([facility])___] : The provider was located at the medical office in [unfilled]. [Home] : The patient, [unfilled], was located at home, [unfilled], at the time of the visit. [Patient's space is appropriate for telehealth and maintains privacy/confidentiality.] : Patient's space is appropriate for telehealth and maintains privacy/confidentiality. [Participant(s) identity verified] : Participant(s) identity verified. [Verbal consent obtained from patient/other participant(s)] : Verbal consent for telehealth/telephonic services obtained from patient/other participant(s) [FreeTextEntry1] : "I need my medications refilled."

## 2025-06-10 NOTE — HISTORY OF PRESENT ILLNESS
[FreeTextEntry1] : Patient is here for 3-month follow-up visit via telehealth.  No medication changes at that time. States she is stable on the current medication regimen.   Mood: anxious and depressed.  Sleepin hours broken. Goes to sleep by 9:30 and wakes up at 1:30 am. Hard to go to sleep.  Appetite: is good.  Energy concentration and motivation are all decreased as she was able to obtain the Adderall. On Oxycodone. Doing yoga classes and crocheting classes.  Started joining the dance class and card game.  Denies any AVH, SI or HI.  Last drink:  where she had 2 drinks.

## 2025-07-21 NOTE — HISTORY OF PRESENT ILLNESS
[] : yes [FreeTextEntry5] : 68 y/o F presents for PO #4. pt states she fell again on 7/19/2025. pt reports an increase in swelling/pain in right knee since fall. also reports recent increase in pain in left knee, requesting left knee CSI.  13-Jun-2020 00:16

## 2025-07-21 NOTE — HISTORY OF PRESENT ILLNESS
[] : yes [FreeTextEntry5] : 66 y/o F presents for PO #4. pt states she fell again on 7/19/2025. pt reports an increase in swelling/pain in right knee since fall. also reports recent increase in pain in left knee, requesting left knee CSI.

## 2025-07-21 NOTE — ASSESSMENT
[FreeTextEntry1] : Patient comes in today for evaluation of both of her knees.  With regards to the right knee she is now 9 months out from her right total knee replacement for posttraumatic arthritis.  She was doing beautifully up until about 2 weeks ago when she developed some pain and swelling along the lateral joint.  For the most part she is okay with walking but has some pain with deep knee bending and stairs.  The other problem is her left knee which she has some arthritis.  She has had some medial joint pain and difficulty walking over the last several months.  She has pain doing stairs and occasionally at night.  Examination of the left lower extremity reveals a normal neurovascular exam.  Examination left knee reveals a full range of motion with a trace effusion.  There is patellofemoral crepitation.  There is mild diffuse joint line pain with no Eileen's sign or instability.  Examination of the right lower extremity reveals a normal neurovascular exam.  Examination of right knee reveals a well-healed midline scar as well as the lateral scar.  She has some fullness over the anterolateral compartment of the knee.  There is pain on hyperflexion.  Her range of motion is 0 to 120 degrees.  There is normal patella tracking with no crepitation and there is no instability through the arc of motion.  X-rays done in the office today of the left knee 4 views weightbearing show bone-on-bone changes of the lateral facet of the patella.  The tibiofemoral joint appears to be well-preserved with mild spurring but no narrowing.  There are no obvious tumors, mass or calcifications seen.  X-rays done in the office today of the right knee 4 views show the right total knee prosthesis in normal alignment in good position with no signs of loosening or wear.  There are no obvious tumors, mass or calcifications seen.  Under sterile conditions the left knee was injected with 5 cc of quarter percent plain Marcaine; 5 cc of 2% plain lidocaine and 80 mg of Depo-Medrol.  Patient tolerated the procedure well.  The plan at this time is ice and activity modification.  Will start her on a Medrol Dosepak.  I will see her back in the office as needed.